# Patient Record
Sex: FEMALE | Race: WHITE | URBAN - METROPOLITAN AREA
[De-identification: names, ages, dates, MRNs, and addresses within clinical notes are randomized per-mention and may not be internally consistent; named-entity substitution may affect disease eponyms.]

---

## 2017-01-25 ENCOUNTER — OFFICE VISIT (OUTPATIENT)
Dept: FAMILY MEDICINE CLINIC | Facility: CLINIC | Age: 37
End: 2017-01-25

## 2017-01-25 VITALS
TEMPERATURE: 98 F | WEIGHT: 159 LBS | SYSTOLIC BLOOD PRESSURE: 116 MMHG | BODY MASS INDEX: 26.49 KG/M2 | HEIGHT: 65 IN | RESPIRATION RATE: 16 BRPM | OXYGEN SATURATION: 98 % | DIASTOLIC BLOOD PRESSURE: 68 MMHG | HEART RATE: 84 BPM

## 2017-01-25 DIAGNOSIS — Z23 NEED FOR VACCINATION: ICD-10-CM

## 2017-01-25 DIAGNOSIS — Z30.016 ENCOUNTER FOR INITIAL PRESCRIPTION OF TRANSDERMAL PATCH HORMONAL CONTRACEPTIVE DEVICE: Primary | ICD-10-CM

## 2017-01-25 DIAGNOSIS — N93.9 VAGINAL SPOTTING: ICD-10-CM

## 2017-01-25 LAB — B-HCG SERPL-ACNC: 0.1 MIU/ML

## 2017-01-25 PROCEDURE — 84702 CHORIONIC GONADOTROPIN TEST: CPT | Performed by: FAMILY MEDICINE

## 2017-01-25 PROCEDURE — 99202 OFFICE O/P NEW SF 15 MIN: CPT | Performed by: FAMILY MEDICINE

## 2017-01-25 NOTE — PROGRESS NOTES
CC:  Patient presents with: Other: wants a pregnancy test      HPI: 43year old female  here to discuss birth control options. Previous PCP Dr. Beti Galvan, saw this doctor for 1-2 years. Came here today because she is uncertain if she is pregnant.  LM Review of patient's allergies indicates no known allergies.       Vitals:    01/25/17  1054   BP: 116/68   Pulse: 84   Temp: 98.3 °F (36.8 °C)   TempSrc: Oral   Resp: 16   Height: 65\"   Weight: 159 lb   SpO2: 98%       Body mass index is 26.46 kg/(m^

## 2017-01-25 NOTE — PATIENT INSTRUCTIONS
Birth Control: IUD (Intrauterine Device)    The IUD (intrauterine device) is small, flexible, and T-shaped. It is placed in the uterus by a trained healthcare provider. The IUD is one of the most effective birth control methods.  It is reversible, which m · Liver problems  · Blood clots (for progestin IUD only)  · Breast cancer or a history of breast cancer (progestin IUD only)   Date Last Reviewed: 5/12/2015  © [id number], [address] [place], [place] [address] [employer].  All rights reser

## 2017-01-26 ENCOUNTER — TELEPHONE (OUTPATIENT)
Dept: FAMILY MEDICINE CLINIC | Facility: CLINIC | Age: 37
End: 2017-01-26

## 2017-01-26 NOTE — TELEPHONE ENCOUNTER
Attempted to contact patient with results but no answer and no VM box set up. Please notify her that her pregnancy blood test was negative for pregnancy.  She can start the birth control as we discussed on the Sunday after her next period, but please again

## 2018-05-22 ENCOUNTER — TELEPHONE (OUTPATIENT)
Dept: FAMILY MEDICINE CLINIC | Facility: CLINIC | Age: 38
End: 2018-05-22

## (undated) NOTE — MR AVS SNAPSHOT
[address] at [facility]  [address] [facility], [address]               Thank you for choosing us for your health care visit with Reynold Francois DO.   We are glad to serve you and happy to provide vagina. This lets you check that the IUD stays in place. Things to know about IUDs  · Can be used by women who have never been pregnant or by women with a history of sexually transmitted infections (STIs) or tubal pregnancy.   · Won’t move from the uterus [address] [facility], [phone], [address] [address]     Phone:  [phone]    - Norelgestromin-Eth Estradiol 150-35 MCG/24HR Ptwk            Today's Orders     HCG, Beta Subunit, Quant Eat plenty of low-fat dairy products High fat meats and dairy   Choose whole grain products Foods high in sodium   Water is best for hydration Fast food.    Eat at home when possible     Tips for increasing your physical activity – Adults who are physically